# Patient Record
Sex: FEMALE | Race: WHITE
[De-identification: names, ages, dates, MRNs, and addresses within clinical notes are randomized per-mention and may not be internally consistent; named-entity substitution may affect disease eponyms.]

---

## 2019-10-23 ENCOUNTER — HOSPITAL ENCOUNTER (OUTPATIENT)
Dept: HOSPITAL 59 - SUR | Age: 64
Discharge: HOME | End: 2019-10-23
Attending: ORTHOPAEDIC SURGERY
Payer: MEDICARE

## 2019-10-23 DIAGNOSIS — G56.01: Primary | ICD-10-CM

## 2019-10-23 PROCEDURE — 64721 CARPAL TUNNEL SURGERY: CPT

## 2019-10-25 NOTE — OPERATIVE NOTE
DATE OF SURGERY: 10/23/2019 



PREOPERATIVE DIAGNOSIS: Right carpal tunnel syndrome. 



POSTOPERATIVE DIAGNOSIS: Right carpal tunnel syndrome. 



OPERATION: Right carpal tunnel release. 



STAFF SURGEON: Pancho Ga MD 



ANESTHESIA: Local. 



PREPARATION: Chloraprep. 



INDIVIDUAL CONSIDERATIONS: None. 



PROCEDURE: The patient was taken to the operating room and placed supine on the 
operating room table. Her right arm was prepped and draped in the usual fashion.
The patient had 2% lidocaine with epinephrine infiltrated along the longitudinal
wrist crease volarly. Sharp dissection carried down after tourniquet was 
inflated to 250 mmHg. The sharp dissection carried down through the longitudinal
wrist crease and then just barely crossing the flexion crease of the wrist at a 
45-degree angle ulnarly. Once through the skin, sharp dissection carried down 
through the palmar fascia and carefully through the transverse metacarpal fascia
distally to the superficial arch and proximally to the antebrachial fascia. The 
median nerve was obviously contused, reddened, and in hourglass configuration. 
There were no tumors present. After irrigation, tourniquet was let down and 
hemostasis was obtained with a Bovie and compression. The skin was then 
approximated with multiple interrupted 4-0 nylon in a vertical mattress fashion.
A sterile bulky compressive hand dressing was applied. The patient tolerated 
procedure well. Needle and sponge counts were correct. Estimated blood loss was 
minimal. She was taken back to recovery in good condition. There were no 
complications. 

[facility]

## 2019-12-03 ENCOUNTER — HOSPITAL ENCOUNTER (INPATIENT)
Dept: HOSPITAL 59 - MEDSURG | Age: 64
LOS: 43 days | Discharge: HOME HEALTH | DRG: 470 | End: 2020-01-15
Attending: ORTHOPAEDIC SURGERY | Admitting: ORTHOPAEDIC SURGERY
Payer: MEDICARE

## 2019-12-03 DIAGNOSIS — M81.0: ICD-10-CM

## 2019-12-03 DIAGNOSIS — M16.12: Primary | ICD-10-CM

## 2019-12-03 DIAGNOSIS — E78.00: ICD-10-CM

## 2019-12-03 DIAGNOSIS — G25.81: ICD-10-CM

## 2019-12-03 PROCEDURE — 80048 BASIC METABOLIC PNL TOTAL CA: CPT

## 2019-12-03 PROCEDURE — 86850 RBC ANTIBODY SCREEN: CPT

## 2019-12-03 PROCEDURE — 94010 BREATHING CAPACITY TEST: CPT

## 2019-12-03 PROCEDURE — 86900 BLOOD TYPING SEROLOGIC ABO: CPT

## 2019-12-03 PROCEDURE — 85014 HEMATOCRIT: CPT

## 2019-12-03 PROCEDURE — 86901 BLOOD TYPING SEROLOGIC RH(D): CPT

## 2019-12-03 PROCEDURE — 85018 HEMOGLOBIN: CPT

## 2019-12-03 PROCEDURE — 76942 ECHO GUIDE FOR BIOPSY: CPT

## 2020-01-14 LAB
ABO GROUP: (no result)
ANTIBODY SCREEN: NEGATIVE
RH TYPE: POSITIVE

## 2020-01-14 PROCEDURE — 0SRB06A REPLACEMENT OF LEFT HIP JOINT WITH OXIDIZED ZIRCONIUM ON POLYETHYLENE SYNTHETIC SUBSTITUTE, UNCEMENTED, OPEN APPROACH: ICD-10-PCS | Performed by: ORTHOPAEDIC SURGERY

## 2020-01-14 RX ADMIN — DOCUSATE SODIUM SCH MG: 100 TABLET, FILM COATED ORAL at 22:25

## 2020-01-14 RX ADMIN — HYDROCODONE BITARTRATE AND ACETAMINOPHEN PRN EACH: 325; 10 TABLET ORAL at 22:23

## 2020-01-14 RX ADMIN — CEFAZOLIN SODIUM SCH MLS/HR: 2 SOLUTION INTRAVENOUS at 16:57

## 2020-01-14 RX ADMIN — DEXTROSE AND SODIUM CHLORIDE SCH: 5; .9 INJECTION, SOLUTION INTRAVENOUS at 20:00

## 2020-01-14 RX ADMIN — HYDROCODONE BITARTRATE AND ACETAMINOPHEN PRN EACH: 325; 10 TABLET ORAL at 14:28

## 2020-01-14 RX ADMIN — DEXTROSE AND SODIUM CHLORIDE SCH MLS/HR: 5; .9 INJECTION, SOLUTION INTRAVENOUS at 15:57

## 2020-01-14 RX ADMIN — HYDROCODONE BITARTRATE AND ACETAMINOPHEN PRN EACH: 325; 10 TABLET ORAL at 18:12

## 2020-01-14 NOTE — REHAB EVALUATION
Patient Information





- Patient Information


Diagnosis: L hip OA


Ordered Treatment: PT Evaluate and Treat


Status: Initial Evaluation


Surgery: Yes (L THR)


Date of Surgery: 01/14/20


Past Medical/Surgical Hx: 


                          PAST MEDICAL/SURGICAL HISTORY





Surgery to Affected Area?        No


Recent Surgery?                  


Past Surgical History            CTR RIGHT


                                 PARTIAL THYROIDECTOMY


                                 CTR LEFT


                                 EAR SX


                                 MELENOMA REMOVAL WITH SKIN GRAFT


                                 C SCOPES


                                 BACK INJS AND RHUZOTOMIES





PMH - Respiratory





Hx Respiratory Disorders         Yes


Hx of URI                        Yes: SX IN DEC CDX'D D/T URI RESOLVED NOW





PMH - Cardiovascular





Hx Cardiovascular Disorders      Yes


Exercise Tolerance               Good


Comment:                         HYPERLIPIDEMIA





PMH - Neuro





Hx Neurological Disorders        Yes


Comment:                         RLS





PMH - GI





Hx Gastrointestinal Disorders    Yes


Hx Gastroesophageal Reflux       Yes: AT TIMES TAKES TUMS





PMH - 





Hx Genitourinary Disorders       No





PMH - Endocrine





Hx Endocrine Disorders           Yes


Hx Thyroid Disease               Yes: PARTIAL THYROIDECTOMY HAD BENIGN TUMOR


                                 REMOVED





PMH - Musculoskeletal





Hx Musculoskeletal Disorders     Yes


Hx Arthritis                     Yes: HIPS HANDS


Hx Osteoporosis                  Yes


Comment:                         RIGHT CTS





PMH - Psych





Hx Psychiatric Problems          No





PMH - Hematology/Oncology





Hx Hematology/Oncology           Yes


Disorders                        


Hx Bruising                      Yes: BRUISES EASILY


Hx Cancer                        Yes: MELENOMA


Hx Chemotherapy                  No


Hx Radiation Therapy             No








Premorbid Status: Detail (The patient was independent with all mobility prior to

surgery.)


Social History: Detail (The patient lives with spouse in 2 story house with 4 

steps at the enterance and one hand rail. The patient will initially be staying 

on the main level. The main bathroom is on the second floor and a half bathroom 

is on the main level. The main bathroom is equipped with: a tub/shower 

combination, hand held shower, shower bench and standard toilet with riser seat.

Grab bars are present by both toilet and shower. The patient has a front wheeled

walker.)


Precautions: Universal, Fall, Other (WBAT on the L LE and THR precautions.)





- Time With Patient


Total Time Spent With Patient (Min): 30


Treatment Procedures: Detail (Initial Evaluation low complexity, gait training)





Subjective Information





- Subjective Information


Per Patient (The patient had no complaints of pain.)





Objective Data





- Mental Status


Patient Orientation: Oriented x3





- Visual Perception


Appears within normal limits for therapeutic activities





- ROM


Not within normal limits (The patient's L hip ins within THR precautions. All 

other LE AROM is WNL)





- Strength/Tone


Not within normal limits (The patient's LE strength was not tested s/p surgery 

however is functional ie: pt. was able to lift LE's in and out of bed.)





- Bed Mobility


Independent (The paitent was independent with supine to and from sit with 

minimal guidance of L LE only. The patient was independent with scooting up in 

bed.)





- Transfers


Independent (The patient was independent with sit to and from stand transfer and

toilet transfer.)





- Balance


Balance Sitting: Good


Balance Standing: Good





- Sensation


Intact





- Gait


Detail (The patient ambulated with front wheeled walker WBAT on L LE a distance 

of 80 feet x 1 with supervision for safety only.)





Therapy Assessment





- Therapy Assessment


Detail (The patient was independent with bed mobility and transfers and required

supervision for safety only with ambulation.The patient will be seen for 1 to 2 

sessions for completion of inpt. goals.)





Problem List





- Problem List


Physical Therapy Problem List: Detail ( Decreased L LE strength)





Goals





- Goals


Physical Therapy Goals: 1) The patient will ambulate on stairs with supervision 

for safety using proper technique.  2) The patient will be independent with THR 

HEP.  3) The patient will ambulate with front wheeled walker 100 feet 

independently WBAT on L LE.





Prognosis





- Prognosis


Good





Plan





- Plan


Physical Therapy Plan: PT 1-2 visits for gait training on levels and stairs and 

instruction in THR HEP.

## 2020-01-15 LAB
ANION GAP SERPL CALC-SCNC: 10 MMOL/L (ref 7–16)
BUN SERPL-MCNC: 9 MG/DL (ref 8–23)
CO2 SERPL-SCNC: 28 MMOL/L (ref 22–29)
CREAT SERPL-MCNC: 0.5 MG/DL (ref 0.5–0.9)
EST GLOMERULAR FILTRATION RATE: > 60 ML/MIN
GLUCOSE SERPL-MCNC: 101 MG/DL (ref 74–109)
HCT VFR BLD CALC: 32.6 % (ref 35–47)
HGB BLD-MCNC: 10.2 GM/DL (ref 11.6–16)

## 2020-01-15 RX ADMIN — DOCUSATE SODIUM SCH MG: 100 TABLET, FILM COATED ORAL at 10:16

## 2020-01-15 RX ADMIN — CEFAZOLIN SODIUM SCH MLS/HR: 2 SOLUTION INTRAVENOUS at 10:16

## 2020-01-15 RX ADMIN — HYDROCODONE BITARTRATE AND ACETAMINOPHEN PRN EACH: 325; 10 TABLET ORAL at 12:59

## 2020-01-15 RX ADMIN — CEFAZOLIN SODIUM SCH MLS/HR: 2 SOLUTION INTRAVENOUS at 02:04

## 2020-01-15 RX ADMIN — HYDROCODONE BITARTRATE AND ACETAMINOPHEN PRN EACH: 325; 10 TABLET ORAL at 02:18

## 2020-01-15 RX ADMIN — HYDROCODONE BITARTRATE AND ACETAMINOPHEN PRN EACH: 325; 10 TABLET ORAL at 07:50

## 2020-01-15 NOTE — PHYSICAL THERAPY TX NOTE
Physical Therapy Tx Note





- Treatment Note


Tolerated: Good


Total Time Spent With Patient: 25


Physical Therapy Tx Note: Detail (The patient was in bed when PT arrived and 

complained of L quad region pain. The patient was independent with supine to and

from sit with use of strap to lift L LE. The patient ambulated to bathroom with 

front wheeled walker with supervision for safety. The patient ambulated with 

front wheeled walker a distance of 120 feet x 1 WBAT on L LE  independently. The

patient ambulated on 3 steps with use of one railing and cane using proper 

technique with supervision for safety only. The patient completed THR HEP 

including: supine heel slides and hip abduction, gluteal sets, quad sets, 

hamstring sets and ankle pumps. The patient has met all inpt. goals and is 

discharged from inpt. PT.)


Physical Therapy Problem List: Detail ( Decreased L LE strength)


Physical Therapy Goals: 1) The patient will ambulate on stairs with supervision 

for safety using proper technique. (Goal Met).  2) The patient will be 

independent with THR HEP. (Goal Met).  3) The patient will ambulate with front 

wheeled walker 100 feet independently WBAT on L LE. (Goal Met)


Physical Therapy Plan: The patient is discharged from inpt. PT and is to 

continue with Home PT.

## 2020-01-15 NOTE — REHAB EVALUATION
Patient Information





- Patient Information


Diagnosis: L hip OA


Ordered Treatment: OT Evaluate and Treat


Status: Initial Evaluation


Surgery: Yes (L THR)


Date of Surgery: 01/14/20


Past Medical/Surgical Hx: 


                          PAST MEDICAL/SURGICAL HISTORY





Surgery to Affected Area?        No


Recent Surgery?                  


Past Surgical History            CTR RIGHT


                                 PARTIAL THYROIDECTOMY


                                 CTR LEFT


                                 EAR SX


                                 MELENOMA REMOVAL WITH SKIN GRAFT


                                 C SCOPES


                                 BACK INJS AND RHUZOTOMIES





PMH - Respiratory





Hx Respiratory Disorders         Yes


Hx of URI                        Yes: SX IN DEC CDX'D D/T URI RESOLVED NOW





PMH - Cardiovascular





Hx Cardiovascular Disorders      Yes


Exercise Tolerance               Good


Comment:                         HYPERLIPIDEMIA





PMH - Neuro





Hx Neurological Disorders        Yes


Comment:                         RLS





PMH - GI





Hx Gastrointestinal Disorders    Yes


Hx Gastroesophageal Reflux       Yes: AT TIMES TAKES TUMS





PMH - 





Hx Genitourinary Disorders       No





PMH - Endocrine





Hx Endocrine Disorders           Yes


Hx Thyroid Disease               Yes: PARTIAL THYROIDECTOMY HAD BENIGN TUMOR


                                 REMOVED





PMH - Musculoskeletal





Hx Musculoskeletal Disorders     Yes


Hx Arthritis                     Yes: HIPS HANDS


Hx Osteoporosis                  Yes


Comment:                         RIGHT CTS





PMH - Psych





Hx Psychiatric Problems          No





PMH - Hematology/Oncology





Hx Hematology/Oncology           Yes


Disorders                        


Hx Bruising                      Yes: BRUISES EASILY


Hx Cancer                        Yes: MELENOMA


Hx Chemotherapy                  No


Hx Radiation Therapy             No








Premorbid Status: Detail (The patient was independent with all ADLs and f

unctional mobility prior to surgery and driving.)


Social History: Detail (The patient lives with her retired spouse in 2 story 

house with 4 steps at the enterance and a left-sided hand rail. The patient will

initially be staying on the main level. The main bathroom is on the second floor

and a half bathroom is on the main level. The main bathroom is equipped with: a 

tub/shower combination, hand held shower, shower bench and standard toilet with 

riser seat. Grab bars are present by both toilet and in the shower. The patient 

has a front wheeled walker and a reacher.)


Precautions: Universal, Fall, Other (WBAT on the L LE and THR precautions.)





- Time With Patient


Total Time Spent With Patient (Min): 26 (1 eval, 1 ADL)





Subjective Information





- Subjective Information


Per Patient (Pt supine in bed upon therapist arrival.  "When can I drive?")





Objective Data





- Pain


Pain Present: No


Pain Scale Used: Numeric (1 - 10)





- Mental Status


Patient Orientation: Oriented x3





- Visual Perception


Appears within normal limits for therapeutic activities





- ROM


Within normal limits (B UEs)





- Strength/Tone


Within normal limits (B UEs)





- Coordination


Appears within normal limits for therapeutic activities





- Bed Mobility


Independent (Supine to/from EOB w/ increased time and effort.  Educated Pt on 

modified technique for increased success.)





- Transfers


Independent (Sit to/from stand to EOB w/ FWW w/ good balance and safety 

awareness.)





- Balance


Balance Sitting: Good


Balance Standing: Good





- Sensation


Intact





- Gait


Detail (Functional mobility w/ FWW within bedroom w/ good balance and safety 

awareness and good use of hip precautions.)





- ADL's/IADL's


Detail (OT educated Pt on use of AE and modified technique for LB dressing and 

Pt demos understanding to don/doff socks, underwear, pants at EOB w/ standing 

for pant mgmt w/ good balance. Educated Pt on compression socks wear schedule, 

tub/shower TF, car TF, modified technique if sitting on lower surfaces and 

importance of elevating surfaces, and Pt verbalizes understanding post several 

questions and therapist education. Pt verbalizes 3/3 hip precautions.)





- Special Tests


No





Therapy Assessment





- Therapy Assessment


Detail (Pt demos good safety and independence w/ all ADLs and functional TFs.  

Reports her  can assist as needed at DC. Pt appears ready for DC home 

when medically ready.)





Patient Education





- Patient Education


Teaching Topic: Equipment Use, Precautions


Response: Return Demonstration, Reinforcement Needed, Verbalize Understanding


Teaching Method: Discussion, Demonstration


Teaching Recipient: Patient


Barriers To Learning: None





Problem List





- Problem List


Physical Therapy Problem List: Detail ( Decreased L LE strength)


Occupational Therapy Problem List: Detail (No further IP OT needs identified.)





Goals





- Goals


Physical Therapy Goals: 1) The patient will ambulate on stairs with supervision 

for safety using proper technique.  2) The patient will be independent with THR 

HEP.  3) The patient will ambulate with front wheeled walker 100 feet independen

tly WBAT on L LE.


Occupational Therapy Goals: No further IP OT needs/goals identified.





Prognosis





- Prognosis


Good





Plan





- Plan


Physical Therapy Plan: PT 1-2 visits for gait training on levels and stairs and 

instruction in THR HEP.


Occupational Therapy Plan: No further IP OT needs identified, DC OT services. Pt

appears ready for home DC when medically ready.  Thank you for this referral.

## 2020-01-15 NOTE — OPERATIVE NOTE
DATE OF SURGERY: 01/14/2020



PREOPERATIVE DIAGNOSIS: End-stage arthrosis of the left hip. 



POSTOPERATIVE DIAGNOSIS: End-stage arthrosis of the left hip. 



OPERATION: Cementless left total hip arthroplasty using [employer] 
components with a size 13 high-offset [place] stem, a size 52 no-hole Reflection
cup with a 32 mm diameter 35-degree offset highly crosslinked liner, and a -3 32
mm diameter Oxinium head. 



STAFF SURGEON: Pancho Ga MD 



ANESTHESIA: Spinal. 



PREPARATION: Chloraprep. 



INDIVIDUAL CONSIDERATIONS: None. 



PROCEDURE: The patient was taken to the operating room, placed supine on the 
operating room table. She had a successful induction of spinal anesthetic. She 
was then placed on her side left side up, and her left leg and hip were prepped 
and draped in the usual fashion. 



The patient had direct posterior approach to the hip. Sharp dissection carried 
down through skin and subcutaneous tissues. Small veins were coagulated with a 
Bovie. The tensor gluteal fascia was opened along the entire length of the 
incision, and deep retractors were placed. Short external rotators were 
identified, piriformis fossa removed exposing the posterior capsule. Posterior 
capsulectomy was performed. Hip was dislocated posteriorly. She had exposed bone
near the weightbearing dome of the head and of the acetabulum. Femoral neck cut 
was then made freehand about a fingerbreadth above the lesser troc using an 
oscillating saw. Then a rim capsulectomy was performed. Starting with a 43 mm 
reamer to just get to the medial wall, I reamed to the introitus to 51 for a 
size 52 cup. I slightly under-reamed to 50. After thorough irrigation, I 
impacted a size 52 no-hole Reflection cup in 20 degrees of forward flexion and 
40 degrees of abduction using the extraarticular alignment guide and bony 
landmarks. There was solid cementless fixation. A center cap screw was placed. I
then impacted a 35-degree offset 32 mm diameter highly crosslinked liner. This 
gave an excellent stable acetabular construct, and this was packed off. 



The proximal femur was delivered into the wound, and box cutting osteotome was 
used to remove the proximal metaphyseal bone. Mid stem reaming was done to a 13.
I started feeling cortex between 11-12. I broached to a size 13. Anteversion was
about 25-30 degrees. After calcar reaming, I found that with a high-offset -3 
head, there was solid stability. I removed the trial, irrigated everything out 
again, impacted the size 12 cementless [place] stem with solid calcar contact. I
dried the [place] taper, impacted a -3 Oxinium head. I reduced the hip with 
absolute stability. Actually, it was fully stable with full flexion and internal
rotation to 90 degrees, full anterior stability in external rotation and 
extension. Normal to shuck. Actually, the only way to dislocate the head would 
be to pull it out with a hook. After irrigation, hemostasis was obtained with a 
Bovie. Sciatic nerve was inspected and found to be completely intact. I mixed 1 
g of tranexamic acid with 30 mL of saline and placed this deep to the fascia. 
The fascia was then closed with a running #2 quill, subcu was closed in layers 
with running 0 quill, skin was closed with staples. The skin and subcutaneous 
tissue were infiltrated with 30 mL of 0.5% Marcaine with epinephrine. Sterile 
bulky compressive JOHN-type dressing was applied. The patient tolerated the 
procedure well. Needle and sponge counts were correct. Estimated blood loss was 
300 mL. We will check hemoglobin in the morning. There were no complications. 

LISHA

## 2020-01-16 NOTE — DISCHARGE SUMMARY
DATE OF ADMISSION: 01/14/2020



DATE OF DISCHARGE: 01/15/2020 



DATE OF SURGERY: 01/14/2020 



HISTORY: The patient is a delightful 64-year-old female who presents with end-
stage arthrosis of her left hip. She was admitted after left total hip 
arthroplasty. Postoperatively she did extremely well. Her hospital course was 
unremarkable. Discharge hemoglobin is 10.2 and did not require transfusion. The 
plan is to discharge her to home in the care of her family and home PT visiting 
nurse has been arranged. She will be given Norco for pain and Xarelto followed 
by aspirin for DVT prophylaxis. Visiting nurse will remove her sutures in 2 
weeks. She will follow up in my office in 4 weeks. 



DISCHARGE CONDITION: Good. 



OPERATIONS OR PROCEDURES: Cementless left total hip arthroplasty. 

LISHA